# Patient Record
Sex: MALE | Race: WHITE | NOT HISPANIC OR LATINO | Employment: OTHER | URBAN - METROPOLITAN AREA
[De-identification: names, ages, dates, MRNs, and addresses within clinical notes are randomized per-mention and may not be internally consistent; named-entity substitution may affect disease eponyms.]

---

## 2018-10-10 ENCOUNTER — APPOINTMENT (EMERGENCY)
Dept: RADIOLOGY | Facility: HOSPITAL | Age: 73
End: 2018-10-10
Payer: COMMERCIAL

## 2018-10-10 ENCOUNTER — HOSPITAL ENCOUNTER (OUTPATIENT)
Facility: HOSPITAL | Age: 73
Setting detail: OBSERVATION
Discharge: HOME | End: 2018-10-10
Attending: SURGERY | Admitting: SURGERY
Payer: COMMERCIAL

## 2018-10-10 VITALS
OXYGEN SATURATION: 99 % | SYSTOLIC BLOOD PRESSURE: 111 MMHG | HEART RATE: 80 BPM | RESPIRATION RATE: 18 BRPM | HEIGHT: 66 IN | TEMPERATURE: 98.1 F | WEIGHT: 191.2 LBS | BODY MASS INDEX: 30.73 KG/M2 | DIASTOLIC BLOOD PRESSURE: 68 MMHG

## 2018-10-10 PROBLEM — T07.XXXA MULTIPLE ABRASIONS: Status: ACTIVE | Noted: 2018-10-10
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
## 2018-10-10 PROBLEM — F10.920 ALCOHOLIC INTOXICATION WITHOUT COMPLICATION (HCC): Status: ACTIVE | Noted: 2018-10-10

## 2018-10-10 LAB
ANION GAP SERPL CALCULATED.3IONS-SCNC: 6 MMOL/L (ref 4–13)
APTT PPP: 27 SECONDS (ref 24–36)
BASE EXCESS BLDA CALC-SCNC: 0 MMOL/L (ref -2–3)
BASOPHILS # BLD AUTO: 0.05 THOUSANDS/ΜL (ref 0–0.1)
BASOPHILS NFR BLD AUTO: 1 % (ref 0–1)
BUN SERPL-MCNC: 11 MG/DL (ref 5–25)
CA-I BLD-SCNC: 1.09 MMOL/L (ref 1.12–1.32)
CALCIUM SERPL-MCNC: 8.5 MG/DL (ref 8.3–10.1)
CHLORIDE SERPL-SCNC: 112 MMOL/L (ref 100–108)
CK MB SERPL-MCNC: 1.3 % (ref 0–2.5)
CK MB SERPL-MCNC: 5.4 NG/ML (ref 0–5)
CK SERPL-CCNC: 415 U/L (ref 39–308)
CO2 SERPL-SCNC: 27 MMOL/L (ref 21–32)
CREAT SERPL-MCNC: 0.75 MG/DL (ref 0.6–1.3)
EOSINOPHIL # BLD AUTO: 0.05 THOUSAND/ΜL (ref 0–0.61)
EOSINOPHIL NFR BLD AUTO: 1 % (ref 0–6)
ERYTHROCYTE [DISTWIDTH] IN BLOOD BY AUTOMATED COUNT: 13 % (ref 11.6–15.1)
ETHANOL SERPL-MCNC: 265 MG/DL (ref 0–3)
GFR SERPL CREATININE-BSD FRML MDRD: 91 ML/MIN/1.73SQ M
GLUCOSE SERPL-MCNC: 100 MG/DL (ref 65–140)
GLUCOSE SERPL-MCNC: 100 MG/DL (ref 65–140)
HCO3 BLDA-SCNC: 26 MMOL/L (ref 24–30)
HCT VFR BLD AUTO: 42.2 % (ref 36.5–49.3)
HCT VFR BLD CALC: 40 % (ref 36.5–49.3)
HGB BLD-MCNC: 14.1 G/DL (ref 12–17)
HGB BLDA-MCNC: 13.6 G/DL (ref 12–17)
IMM GRANULOCYTES # BLD AUTO: 0.03 THOUSAND/UL (ref 0–0.2)
IMM GRANULOCYTES NFR BLD AUTO: 0 % (ref 0–2)
INR PPP: 1.11 (ref 0.86–1.17)
LYMPHOCYTES # BLD AUTO: 3.64 THOUSANDS/ΜL (ref 0.6–4.47)
LYMPHOCYTES NFR BLD AUTO: 46 % (ref 14–44)
MCH RBC QN AUTO: 31.1 PG (ref 26.8–34.3)
MCHC RBC AUTO-ENTMCNC: 33.4 G/DL (ref 31.4–37.4)
MCV RBC AUTO: 93 FL (ref 82–98)
MONOCYTES # BLD AUTO: 0.52 THOUSAND/ΜL (ref 0.17–1.22)
MONOCYTES NFR BLD AUTO: 7 % (ref 4–12)
NEUTROPHILS # BLD AUTO: 3.59 THOUSANDS/ΜL (ref 1.85–7.62)
NEUTS SEG NFR BLD AUTO: 45 % (ref 43–75)
NRBC BLD AUTO-RTO: 0 /100 WBCS
PCO2 BLD: 27 MMOL/L (ref 21–32)
PCO2 BLD: 45.1 MM HG (ref 42–50)
PH BLD: 7.37 [PH] (ref 7.3–7.4)
PLATELET # BLD AUTO: 197 THOUSANDS/UL (ref 149–390)
PMV BLD AUTO: 10.5 FL (ref 8.9–12.7)
PO2 BLD: 35 MM HG (ref 35–45)
POTASSIUM BLD-SCNC: 3.9 MMOL/L (ref 3.5–5.3)
POTASSIUM SERPL-SCNC: 3.8 MMOL/L (ref 3.5–5.3)
PROTHROMBIN TIME: 14.4 SECONDS (ref 11.8–14.2)
RBC # BLD AUTO: 4.53 MILLION/UL (ref 3.88–5.62)
SAO2 % BLD FROM PO2: 65 % (ref 95–98)
SODIUM BLD-SCNC: 146 MMOL/L (ref 136–145)
SODIUM SERPL-SCNC: 145 MMOL/L (ref 136–145)
SPECIMEN SOURCE: ABNORMAL
WBC # BLD AUTO: 7.88 THOUSAND/UL (ref 4.31–10.16)

## 2018-10-10 PROCEDURE — 85610 PROTHROMBIN TIME: CPT | Performed by: EMERGENCY MEDICINE

## 2018-10-10 PROCEDURE — 82553 CREATINE MB FRACTION: CPT | Performed by: EMERGENCY MEDICINE

## 2018-10-10 PROCEDURE — 36415 COLL VENOUS BLD VENIPUNCTURE: CPT | Performed by: EMERGENCY MEDICINE

## 2018-10-10 PROCEDURE — 99285 EMERGENCY DEPT VISIT HI MDM: CPT

## 2018-10-10 PROCEDURE — 80320 DRUG SCREEN QUANTALCOHOLS: CPT | Performed by: EMERGENCY MEDICINE

## 2018-10-10 PROCEDURE — 82550 ASSAY OF CK (CPK): CPT | Performed by: EMERGENCY MEDICINE

## 2018-10-10 PROCEDURE — 85730 THROMBOPLASTIN TIME PARTIAL: CPT | Performed by: EMERGENCY MEDICINE

## 2018-10-10 PROCEDURE — 70450 CT HEAD/BRAIN W/O DYE: CPT

## 2018-10-10 PROCEDURE — 82803 BLOOD GASES ANY COMBINATION: CPT

## 2018-10-10 PROCEDURE — 84295 ASSAY OF SERUM SODIUM: CPT

## 2018-10-10 PROCEDURE — 85014 HEMATOCRIT: CPT

## 2018-10-10 PROCEDURE — 80048 BASIC METABOLIC PNL TOTAL CA: CPT | Performed by: EMERGENCY MEDICINE

## 2018-10-10 PROCEDURE — 72125 CT NECK SPINE W/O DYE: CPT

## 2018-10-10 PROCEDURE — 82947 ASSAY GLUCOSE BLOOD QUANT: CPT

## 2018-10-10 PROCEDURE — 82330 ASSAY OF CALCIUM: CPT

## 2018-10-10 PROCEDURE — 99218 PR INITIAL OBSERVATION CARE/DAY 30 MINUTES: CPT | Performed by: SURGERY

## 2018-10-10 PROCEDURE — 84132 ASSAY OF SERUM POTASSIUM: CPT

## 2018-10-10 PROCEDURE — 96365 THER/PROPH/DIAG IV INF INIT: CPT

## 2018-10-10 PROCEDURE — 85025 COMPLETE CBC W/AUTO DIFF WBC: CPT | Performed by: EMERGENCY MEDICINE

## 2018-10-10 RX ORDER — DUTASTERIDE 0.5 MG/1
0.5 CAPSULE, LIQUID FILLED ORAL DAILY
COMMUNITY

## 2018-10-10 RX ORDER — TAMSULOSIN HYDROCHLORIDE 0.4 MG/1
0.4 CAPSULE ORAL
COMMUNITY

## 2018-10-10 RX ORDER — SODIUM CHLORIDE, SODIUM GLUCONATE, SODIUM ACETATE, POTASSIUM CHLORIDE, MAGNESIUM CHLORIDE, SODIUM PHOSPHATE, DIBASIC, AND POTASSIUM PHOSPHATE .53; .5; .37; .037; .03; .012; .00082 G/100ML; G/100ML; G/100ML; G/100ML; G/100ML; G/100ML; G/100ML
INJECTION, SOLUTION INTRAVENOUS
Status: COMPLETED | OUTPATIENT
Start: 2018-10-10 | End: 2018-10-10

## 2018-10-10 RX ORDER — SODIUM CHLORIDE, SODIUM GLUCONATE, SODIUM ACETATE, POTASSIUM CHLORIDE, MAGNESIUM CHLORIDE, SODIUM PHOSPHATE, DIBASIC, AND POTASSIUM PHOSPHATE .53; .5; .37; .037; .03; .012; .00082 G/100ML; G/100ML; G/100ML; G/100ML; G/100ML; G/100ML; G/100ML
100 INJECTION, SOLUTION INTRAVENOUS CONTINUOUS
Status: DISCONTINUED | OUTPATIENT
Start: 2018-10-10 | End: 2018-10-10 | Stop reason: HOSPADM

## 2018-10-10 RX ORDER — FOLIC ACID 1 MG/1
1 TABLET ORAL DAILY
Status: DISCONTINUED | OUTPATIENT
Start: 2018-10-10 | End: 2018-10-10 | Stop reason: HOSPADM

## 2018-10-10 RX ORDER — ATORVASTATIN CALCIUM 10 MG/1
10 TABLET, FILM COATED ORAL DAILY
COMMUNITY

## 2018-10-10 RX ORDER — ACETAMINOPHEN 325 MG/1
975 TABLET ORAL EVERY 8 HOURS SCHEDULED
Status: DISCONTINUED | OUTPATIENT
Start: 2018-10-10 | End: 2018-10-10 | Stop reason: HOSPADM

## 2018-10-10 RX ORDER — ZOLPIDEM TARTRATE 12.5 MG/1
12.5 TABLET, FILM COATED, EXTENDED RELEASE ORAL
COMMUNITY

## 2018-10-10 RX ORDER — LOSARTAN POTASSIUM 100 MG/1
100 TABLET ORAL DAILY
COMMUNITY

## 2018-10-10 RX ORDER — LATANOPROST 50 UG/ML
1 SOLUTION/ DROPS OPHTHALMIC
COMMUNITY

## 2018-10-10 RX ORDER — CLONAZEPAM 0.5 MG/1
0.5 TABLET ORAL 2 TIMES DAILY
COMMUNITY

## 2018-10-10 RX ORDER — BUTALBITAL, ACETAMINOPHEN AND CAFFEINE 50; 325; 40 MG/1; MG/1; MG/1
1 TABLET ORAL EVERY 4 HOURS PRN
Status: DISCONTINUED | OUTPATIENT
Start: 2018-10-10 | End: 2018-10-10 | Stop reason: HOSPADM

## 2018-10-10 RX ORDER — THIAMINE MONONITRATE (VIT B1) 100 MG
100 TABLET ORAL DAILY
Status: DISCONTINUED | OUTPATIENT
Start: 2018-10-10 | End: 2018-10-10 | Stop reason: HOSPADM

## 2018-10-10 RX ADMIN — Medication 1 TABLET: at 08:33

## 2018-10-10 RX ADMIN — SODIUM CHLORIDE, SODIUM GLUCONATE, SODIUM ACETATE, POTASSIUM CHLORIDE, MAGNESIUM CHLORIDE, SODIUM PHOSPHATE, DIBASIC, AND POTASSIUM PHOSPHATE 1000 ML: .53; .5; .37; .037; .03; .012; .00082 INJECTION, SOLUTION INTRAVENOUS at 03:20

## 2018-10-10 RX ADMIN — THIAMINE HCL TAB 100 MG 100 MG: 100 TAB at 08:33

## 2018-10-10 RX ADMIN — SODIUM CHLORIDE, SODIUM GLUCONATE, SODIUM ACETATE, POTASSIUM CHLORIDE, MAGNESIUM CHLORIDE, SODIUM PHOSPHATE, DIBASIC, AND POTASSIUM PHOSPHATE 100 ML/HR: .53; .5; .37; .037; .03; .012; .00082 INJECTION, SOLUTION INTRAVENOUS at 05:43

## 2018-10-10 RX ADMIN — FOLIC ACID 1 MG: 1 TABLET ORAL at 08:33

## 2018-10-10 RX ADMIN — BUTALBITAL, ACETAMINOPHEN, AND CAFFEINE 1 TABLET: 50; 325; 40 TABLET ORAL at 12:23

## 2018-10-10 NOTE — H&P
H&P Exam - Trauma   Judit Luong 67 y o  male MRN: [id number]  Unit/Bed#: TR 02 Encounter: [id number]    Trauma Assessment and Plan: This is a 67year old male who presents as a level B trauma after being found down by bystanders with confusion and an occipital confusion, positive EtOH  Assessment/Plan   Trauma Alert: Level B  Model of Arrival: Ambulance  Trauma Team: Attending Christoph Guillory, Residents [address] and Fellow Jane  Consultants: None    Trauma Active Problems: Multiple abrasions, alcohol intoxication    Trauma Plan:   Alcohol intoxication  -  Admit trauma obs  -  CIWA protocol    Multiple abrasions  -  Local wound care    Negative CT C-spine and no neurologic deficits  C-collar can be cleared  The sensitivity and specificity for clinically significant injury is 98 5% and 91 0% with a negative predictive value of 99 97%  *    Based on EAST guidelines, C-spine can be cleared based on negative CT C-spine alone  **    *Nestor RIVERA, et  al   "Cervical spinal clearance:  A prospective Western Trauma Association Multi-intitutional Trial "  Journal of Trauma Acure Care Surgery  2016; 81(6):  1122  **Of five articles with a total follow-up of 1,017 included subjects, none reported new neurologic change (paraplegia or quadriplegia) after cervical collar removal  Of 11 studies with a total of 1,718 subjects, no study reported an unstable C-spine fracture; one of the studies did not clearly report this outcome  The negative predictive value for C-spine CT was 100% for an unstable C-spine injury and 91% for any stable injury of the C-spine     Chief Complaint: No complaints at this time    History of Present Illness   HPI:  Judit Luong is a 67 y o  male who presents as a level B trauma after being found down by bystanders with confusion and bleeding from posterior scalp  The patient states that he was drinking alcohol tonight and does not remember what led up to the events of his fall    Currently, denies any pain   He is up to date on his tetanus  Mechanism:Fall    Review of Systems   Constitutional: Negative for chills, fatigue and fever  HENT: Negative for rhinorrhea, sore throat and trouble swallowing  Eyes: Negative for photophobia and visual disturbance  Respiratory: Negative for cough, chest tightness and shortness of breath  Cardiovascular: Negative for chest pain, palpitations and leg swelling  Gastrointestinal: Negative for abdominal pain, blood in stool, diarrhea, nausea and vomiting  Endocrine: Negative for polyuria  Genitourinary: Negative for dysuria, flank pain and hematuria  Musculoskeletal: Negative for back pain and neck pain  Skin: Positive for wound  Negative for color change and rash  Allergic/Immunologic: Negative for immunocompromised state  Neurological: Negative for dizziness, weakness, light-headedness, numbness and headaches  All other systems reviewed and are negative  Historical Information     Past Medical History: No past medical history on file , Past Surgical History: No past surgical history on file , Alcohol Use:   History   Alcohol use Not on file   , Drug Use:   History   Drug use: Unknown   , Family History: non-contributory    Meds/Allergies     (Not in a hospital admission)      Allergies not on file      PHYSICAL EXAM    Objective   Vitals:   First set: Temperature: (!) 96 5 °F (35 8 °C) (10/10/18 0314)  Pulse: 78 (10/10/18 0314)  Respirations: 19 (10/10/18 0314)  Blood Pressure: 111/69 (10/10/18 0314)    Primary Survey:   (A) Airway: Intact  (B) Breathing: CTA b/l  (C) Circulation: Pulses:   carotid  2/4, pedal  2/4, radial  2/4 and femoral  2/4  (D) Disabliity:  GCS Total:  15  (E) Expose:  Completed    Secondary Survey: (Click on Physical Exam tab above)  Physical Exam   Constitutional: He is oriented to person, place, and time  Vital signs are normal  He appears well-developed and well-nourished  He is cooperative  He does not appear ill  HENT:   Right Ear: Hearing, tympanic membrane, external ear and ear canal normal  No hemotympanum  Left Ear: Hearing, tympanic membrane, external ear and ear canal normal  No hemotympanum  Nose: Nose normal  No septal deviation or nasal septal hematoma  Mouth/Throat: Uvula is midline, oropharynx is clear and moist and mucous membranes are normal    Abrasions to nose and occiput   Eyes: Pupils are equal, round, and reactive to light  Conjunctivae, EOM and lids are normal    Neck: Trachea normal  No spinous process tenderness and no muscular tenderness present  Cardiovascular: Normal rate, regular rhythm, normal heart sounds, intact distal pulses and normal pulses  No murmur heard  Pulses:       Carotid pulses are 2+ on the right side, and 2+ on the left side  Radial pulses are 2+ on the right side, and 2+ on the left side  Femoral pulses are 2+ on the right side, and 2+ on the left side  Dorsalis pedis pulses are 2+ on the right side, and 2+ on the left side  Pulmonary/Chest: Effort normal and breath sounds normal  He exhibits no tenderness and no bony tenderness  Abdominal: Soft  Normal appearance and bowel sounds are normal  There is no tenderness  There is no rigidity, no rebound and no guarding  Genitourinary:   Genitourinary Comments: No perineal hematoma   Musculoskeletal:   No C-spine, T-spine, L-spine tenderness  No bony tenderness throughout  Pelvis stable nontender  Negative logroll bilateral lower extremities  Neurological: He is alert and oriented to person, place, and time  He has normal strength  No cranial nerve deficit or sensory deficit  Coordination normal  GCS eye subscore is 4  GCS verbal subscore is 5  GCS motor subscore is 6  Invasive Devices     Peripheral Intravenous Line            Peripheral IV 10/10/18 Right Antecubital less than 1 day                Lab Results: Results: I have personally reviewed pertinent reports      Imaging/EKG Studies: Results: I have personally reviewed pertinent reports  , FAST: Negative  Other Studies: I have reviewed all pertinent reports  Code Status: No Order  Advance Directive and Living Will:      Power of :    POLST:      Counseling / Coordination of Care  Total floor / unit time spent today 60 minutes  This involved direct patient contact where I performed a full history and physical, reviewed previous records, and reviewed laboratory and other diagnostic studies  Total Critical Care time spent 60 minutes excluding procedures, teaching and family updates

## 2018-10-10 NOTE — DISCHARGE INSTRUCTIONS
You are at risk for developing a concussion  You may experience headaches, photophobia, nausea, dizziness or generalized fatigue  If you develop these symptoms, call the trauma clinic to schedule follow-up  This injury will take time to heal  COGNITIVE REST and TIME are key to healing  Please limit reading, texting, and/or computer use to 15 minute intervals and only as tolerates  Avoid excessive external stimulation, such as caring for children, pets or going to class or work  Please call the Trauma Clinic sooner with any questions or concerns or if you experience any worsening symptoms or new nausea, vomiting, vision or hearing changes, or worsening headache symptoms

## 2018-10-10 NOTE — UTILIZATION REVIEW
Initial Clinical Review    Admission: Date/Time/Statement: 10/10 @ 0405    Orders Placed This Encounter   Procedures    Place in Observation     Standing Status:   Standing     Number of Occurrences:   1     Order Specific Question:   Admitting Physician     Answer:   Francie Narayan [[id number]]     Order Specific Question:   Level of Care     Answer:   Med Surg [16]       ED: Date/Time/Mode of Arrival:   ED Arrival Information     Expected Arrival Acuity Means of Arrival Escorted By Service Admission Type    - 10/10/2018 03:12 Immediate [facility] Emergency    Arrival Complaint    -          Chief Complaint:   Chief Complaint   Patient presents with    Fall       History of Illness: 67 y o  male who presents as a level B trauma after being found down by bystanders with confusion and bleeding from posterior scalp  The patient states that he was drinking alcohol tonight and does not remember what led up to the events of his fall  ED Vital Signs:   ED Triage Vitals   Temperature Pulse Respirations Blood Pressure SpO2   10/10/18 0314 10/10/18 0314 10/10/18 0314 10/10/18 0314 10/10/18 0314   (!) 96 5 °F (35 8 °C) 78 19 111/69 100 %      Temp Source Heart Rate Source Patient Position - Orthostatic VS BP Location FiO2 (%)   10/10/18 0314 10/10/18 0314 10/10/18 0319 10/10/18 0507 --   Tympanic Monitor Lying Left arm       Pain Score       --               Wt Readings from Last 1 Encounters:   10/10/18 86 7 kg (191 lb 3 2 oz)       Vital Signs (abnormal): WNL    Abnormal Labs: CK MB = 5 4  Total CK = 415  Etoh Level = 265    Diagnostic Test Results: CT Head - No acute intracranial abnormality  ED Treatment:   Medication Administration from 10/10/2018 0302 to 10/10/2018 0503       Date/Time Order Dose Route Action     10/10/2018 0320 multi-electrolyte (ISOLYTE-S PH 7 4) bolus 1,000 mL Intravenous New Bag       Past Medical/Surgical History:    Active Ambulatory Problems     Diagnosis Date Noted    No Active Ambulatory Problems     Resolved Ambulatory Problems     Diagnosis Date Noted    No Resolved Ambulatory Problems     Past Medical History:   Diagnosis Date    Depression     Hypertension     Insomnia        Admitting Diagnosis: Head injury [S09 90XA]    Age/Sex: 67 y o  male    Assessment/Plan:   75y Male to ED, with Multiple abrasions/ Alcohol Intoxication  Pt found down and does not remember what happened  CIWA protocol   Local wound care    CIWA Score = 0    Admission Orders:  Continuous Pulse Ox    Scheduled Meds:   Current Facility-Administered Medications:  folic acid 1 mg Oral Daily   multivitamin-minerals 1 tablet Oral Daily   thiamine 100 mg Oral Daily     Continuous Infusions:   multi-electrolyte 100 mL/hr Last Rate: 100 mL/hr (10/10/18 0543)     PRN Meds:

## 2018-10-10 NOTE — SOCIAL WORK
CM met with pt and his sister to discuss the role of CM  Pt lives alone (wife in [place]) in a 2nd floor apartment which has [address]STE  Pt drives and was fully independent  Pt owns no DME or living will  Pt's pharmacy is [facility] [place]  Pt reports no hx of mental health though he is stressed with his wife being in a nursing home  Pt admits to occasional drinking but when drinking, he admits to binge drinking  "I will drink until I fall down " Pt reports having blackouts and a DUI in 1994  Pt reports never having withdrawal symptoms  Pt is familiar with D&A resources in the area  Pt offered HOST but pt refused  SBIRT was completed  Pt will attempt a diet and then should d/c home  Pt's sister will transport  CM reviewed d/c planning process including the following: identifying help at home, patient preference for d/c planning needs, Discharge Lounge, [facility] Meds to Bed program, availability of treatment team to discuss questions or concerns patient and/or family may have regarding understanding medications and recognizing signs and symptoms once discharged  CM also encouraged patient to follow up with all recommended appointments after discharge  Patient advised of importance for patient and family to participate in managing patients medical well being

## 2018-10-10 NOTE — DISCHARGE SUMMARY
Discharge Summary - [address] 67 y o  male MRN: [id number]    Unit/Bed#: [facility] 922-01 Encounter: [id number]    Admission Date:   Admission Orders     Ordered        10/10/18 0404  Place in Observation  Once               Admitting Diagnosis: Head injury [S09 90XA]    HPI: Per Dr Eber Stewart on admission "Dawson [address] is a 67 y o  male who presents as a level B trauma after being found down by bystanders with confusion and bleeding from posterior scalp  The patient states that he was drinking alcohol tonight and does not remember what led up to the events of his fall  Currently, denies any pain  He is up to date on his tetanus "    Procedures Performed: No orders of the defined types were placed in this encounter  Summary of Hospital Course: Mr Prabha Moseley was monitored after fall  He was not found to have any traumatic injuries  He had no symptoms of concussion on day of discharge  He has been ambulating without difficulty  He is stable for discharge to home with his wife  He was provided with trauma's contact information should he develop problems with concussion or concussive symptoms  He was offered resources for alcohol cessation but declined, stating he does not usually drink  Tertiary:   ROS: "I am ok  Embarrassed " denies headache, nausea, vomiting, dizziness, difficulty with ROM, gait disturbance, pain or contusions  Denies SOB or CP  PE:  Lying in bed, appears comfortable  Heart rate regular, +2 DP pulses, no edema  Lung fields are clear, no wheezing or crackles, unlabored respirations  Abdomen soft nontender  No hematoma, contusion, bruising  No apparent deformity or difficulty with ROM  Strength equal bilaterally  Neuro: GCS 15, no deficits      Significant Findings, Care, Treatment and Services Provided:   Trauma - Ct Head Wo Contrast    Result Date: 10/10/2018  Impression: No acute intracranial abnormality   Workstation performed: [id number]     Trauma - Ct Spine Cervical Wo Contrast    Result Date: 10/10/2018  Impression: No cervical spine fracture or traumatic malalignment  Workstation performed: [id number]     Xr Trauma Multiple    Result Date: 10/10/2018  Impression: No active pulmonary disease  Workstation performed: [id number]       Complications: none    Discharge Diagnosis:   Patient Active Problem List   Diagnosis    Alcoholic intoxication without complication ([facility] [address] )    Multiple abrasions    Fall             Condition at Discharge: good         Discharge instructions/Information to patient and family:   See after visit summary for information provided to patient and family  Provisions for Follow-Up Care:  See after visit summary for information related to follow-up care and any pertinent home health orders  PCP: Frederick Haley MD    Disposition: Home    Planned Readmission: No    Discharge Statement   I spent 12 minutes discharging the patient  This time was spent on the day of discharge  I had direct contact with the patient on the day of discharge  Additional documentation is required if more than 30 minutes were spent on discharge  Discharge Medications:  See after visit summary for reconciled discharge medications provided to patient and family

## 2018-10-10 NOTE — ED NOTES
I STAT cg8 that resulted at [facility] on 10/10/18 ran under incorrect patient  Lab made aware and entry will be deleted        Lily Arch  10/10/18 3850

## 2023-09-27 ENCOUNTER — TELEPHONE (OUTPATIENT)
Dept: GASTROENTEROLOGY | Facility: CLINIC | Age: 78
End: 2023-09-27

## 2023-09-27 ENCOUNTER — CONSULT (OUTPATIENT)
Dept: GASTROENTEROLOGY | Facility: CLINIC | Age: 78
End: 2023-09-27
Payer: COMMERCIAL

## 2023-09-27 VITALS — HEIGHT: 68 IN | WEIGHT: 216.8 LBS | BODY MASS INDEX: 32.86 KG/M2

## 2023-09-27 DIAGNOSIS — Z86.010 HISTORY OF ADENOMATOUS POLYP OF COLON: Primary | ICD-10-CM

## 2023-09-27 DIAGNOSIS — Z86.010 PERSONAL HISTORY OF COLONIC POLYPS: ICD-10-CM

## 2023-09-27 DIAGNOSIS — I48.91 ATRIAL FIBRILLATION, UNSPECIFIED TYPE (HCC): ICD-10-CM

## 2023-09-27 DIAGNOSIS — K59.00 CONSTIPATION, UNSPECIFIED CONSTIPATION TYPE: ICD-10-CM

## 2023-09-27 DIAGNOSIS — K57.90 DIVERTICULAR DISEASE: ICD-10-CM

## 2023-09-27 PROCEDURE — 99204 OFFICE O/P NEW MOD 45 MIN: CPT | Performed by: INTERNAL MEDICINE

## 2023-09-27 RX ORDER — AMLODIPINE BESYLATE 10 MG/1
10 TABLET ORAL DAILY
COMMUNITY

## 2023-09-27 RX ORDER — POLYETHYLENE GLYCOL 3350, SODIUM SULFATE ANHYDROUS, SODIUM BICARBONATE, SODIUM CHLORIDE, POTASSIUM CHLORIDE 236; 22.74; 6.74; 5.86; 2.97 G/4L; G/4L; G/4L; G/4L; G/4L
4 POWDER, FOR SOLUTION ORAL ONCE
Qty: 4000 ML | Refills: 0 | Status: SHIPPED | OUTPATIENT
Start: 2023-09-27 | End: 2023-09-27

## 2023-09-27 RX ORDER — CALCIUM CARBONATE/VITAMIN D3 500MG-5MCG
TABLET ORAL
COMMUNITY

## 2023-09-27 RX ORDER — TROSPIUM CHLORIDE 20 MG/1
TABLET, FILM COATED ORAL
COMMUNITY
Start: 2023-08-31

## 2023-09-27 RX ORDER — TROSPIUM CHLORIDE 20 MG/1
20 TABLET, FILM COATED ORAL 2 TIMES DAILY
COMMUNITY
Start: 2023-05-10 | End: 2024-05-09

## 2023-09-27 NOTE — PROGRESS NOTES
[facility] - Outpatient Consultation  Abdias Byers 65 y.o. male MRN: [id number]  Encounter: [id number]          ASSESSMENT AND PLAN:      1. History of adenomatous polyp of colon  -     Colonoscopy; Future; Expected date: 09/27/2023  -     polyethylene glycol (Golytely) 4000 mL solution; Take 4,000 mL by mouth once for 1 dose Take according to instructions given by the office for colonoscopy bowel prep. 2. Personal history of colonic polyps    3. Diverticular disease    4. Constipation, unspecified constipation type    5. Atrial fibrillation, unspecified type ([address])  -     Colonoscopy; Future; Expected date: 09/27/2023      History of adenomatous colon polyps, diverticulosis, recent constipation, advised patient to increase fluid and fiber in the diet to manage the bowels better. Less mobility may be playing a role as well. Some of the medication could be contributing to. He can take some MiraLAX or stool softeners to manage the bowels better. Schedule colonoscopy, will get clearance for cardiology stop Eliquis as well. History of A-fib CVA on Eliquis.  ______________________________________________________________________    HPI:      Patient came in for evaluation and discussion of his history of adenomatous colon polyps, diverticulosis, recent constipation, wants consider evaluation with colonoscopy. Appetite is fair weight stable denies dysphagia coughing choking spells nocturnal reflux regurgitation bronchitis pneumonias. Trying to consume more fluid and fiber in the diet to improve his bowels. Does have history of A-fib, CVA couple years ago, on Eliquis. Diet medications more than 10 pertinent systems some of the prior records noted. REVIEW OF SYSTEMS:    CONSTITUTIONAL: Denies any fever, chills, rigors, and weight loss. HEENT: No earache or tinnitus. CARDIOVASCULAR: No chest pain or palpitations.    RESPIRATORY: Denies any cough, hemoptysis, shortness of breath or dyspnea on exertion. GASTROINTESTINAL: As noted in the History of Present Illness. GENITOURINARY: Denies any hematuria or dysuria. NEUROLOGIC: No dizziness or vertigo. MUSCULOSKELETAL: Denies any joint swellings. SKIN: Denies skin rashes or itching. ENDOCRINE: Denies excessive thirst. Denies intolerance to heat or cold. PSYCHOSOCIAL: Denies depression or anxiety. Denies any recent memory loss. Historical Information   Past Medical History:   Diagnosis Date   • A-fib ([address])    • Colon polyp    • Depression    • Hypertension    • Insomnia      Past Surgical History:   Procedure Laterality Date   • COLONOSCOPY       Social History   Social History     Substance and Sexual Activity   Alcohol Use Yes    Comment: drinks liquor and beer; cant say how much     Social History     Substance and Sexual Activity   Drug Use No     Social History     Tobacco Use   Smoking Status Never   Smokeless Tobacco Never   Tobacco Comments    Cigar once in awhile     Family History   Problem Relation Age of Onset   • Pancreatic cancer Son        Meds/Allergies       Current Outpatient Medications:   •  amLODIPine (NORVASC) 10 mg tablet  •  apixaban (Eliquis) 5 mg  •  atorvastatin (LIPITOR) 10 mg tablet  •  Calcium Carb-Cholecalciferol (Os-Jose C Calcium + D3) 500-5 MG-MCG TABS  •  latanoprost (XALATAN) 0.005 % ophthalmic solution  •  losartan (COZAAR) 100 MG tablet  •  metoprolol tartrate (LOPRESSOR) 25 mg tablet  •  polyethylene glycol (Golytely) 4000 mL solution  •  trospium chloride (SANCTURA) 20 mg tablet  •  clonazePAM (KlonoPIN) 0.5 mg tablet  •  dutasteride (AVODART) 0.5 mg capsule  •  tamsulosin (FLOMAX) 0.4 mg  •  trospium chloride (SANCTURA) 20 mg tablet  •  zolpidem (AMBIEN CR) 12.5 MG CR tablet    No Known Allergies        Objective     Height 5' 8" (1.727 m), weight 98.3 kg (216 lb 12.8 oz). Body mass index is 32.96 kg/m².         PHYSICAL EXAM:      General Appearance:   Alert, cooperative, no distress HEENT:   Normocephalic, atraumatic, anicteric. Neck:  Supple, symmetrical, trachea midline   Lungs:   Clear to auscultation bilaterally; no rales, rhonchi or wheezing; respirations unlabored    Heart[de-identified]   Regular rate and rhythm; no murmur. Abdomen:   Soft, non-tender, non-distended; normal bowel sounds; no masses, no organomegaly    Genitalia:   Deferred    Rectal:   Deferred    Extremities:  No cyanosis, clubbing or edema    Skin:  No jaundice, rashes, or lesions    Lymph nodes:  No palpable cervical lymphadenopathy        Lab Results:   No visits with results within 1 Day(s) from this visit.    Latest known visit with results is:   Admission on 10/10/2018, Discharged on 10/10/2018   Component Date Value   • WBC 10/10/2018 7.88    • RBC 10/10/2018 4.53    • Hemoglobin 10/10/2018 14.1    • Hematocrit 10/10/2018 42.2    • MCV 10/10/2018 93    • MCH 10/10/2018 31.1    • MCHC 10/10/2018 33.4    • RDW 10/10/2018 13.0    • MPV 10/10/2018 10.5    • Platelets 77/30/5066 197    • nRBC 10/10/2018 0    • Neutrophils Relative 10/10/2018 45    • Immat GRANS % 10/10/2018 0    • Lymphocytes Relative 10/10/2018 46 (H)    • Monocytes Relative 10/10/2018 7    • Eosinophils Relative 10/10/2018 1    • Basophils Relative 10/10/2018 1    • Neutrophils Absolute 10/10/2018 3.59    • Immature Grans Absolute 10/10/2018 0.03    • Lymphocytes Absolute 10/10/2018 3.64    • Monocytes Absolute 10/10/2018 0.52    • Eosinophils Absolute 10/10/2018 0.05    • Basophils Absolute 10/10/2018 0.05    • Sodium 10/10/2018 145    • Potassium 10/10/2018 3.8    • Chloride 10/10/2018 112 (H)    • CO2 10/10/2018 27    • ANION GAP 10/10/2018 6    • BUN 10/10/2018 11    • Creatinine 10/10/2018 0.75    • Glucose 10/10/2018 100    • Calcium 10/10/2018 8.5    • eGFR 10/10/2018 91    • Protime 10/10/2018 14.4 (H)    • INR 10/10/2018 1.11    • PTT 10/10/2018 27    • Ethanol Lvl 10/10/2018 265 (H)    • Total CK 10/10/2018 415 (H)    • ph, [place] ISTAT 10/10/2018 • pCO2, [place] i-STAT 10/10/2018     • pO2, [place] i-STAT 10/10/2018     • BE, i-STAT 10/10/2018     • HCO3, [place]zar i-STAT 10/10/2018     • CO2, i-STAT 10/10/2018     • O2 Sat, i-STAT 10/10/2018     • SODIUM, I-STAT 10/10/2018     • Potassium, i-STAT 10/10/2018     • Calcium, Ionized i-STAT 10/10/2018     • Hct, i-STAT 10/10/2018     • Hgb, i-STAT 10/10/2018     • Glucose, i-STAT 10/10/2018     • Specimen Type 10/10/2018     • ph, [place] ISTAT 10/10/2018 7.369    • pCO2, [place] i-STAT 10/10/2018 45.1    • pO2, [place] i-STAT 10/10/2018 35.0    • BE, i-STAT 10/10/2018 0    • HCO3, [place]zar i-STAT 10/10/2018 26.0    • CO2, i-STAT 10/10/2018 27    • O2 Sat, i-STAT 10/10/2018 65 (L)    • SODIUM, I-STAT 10/10/2018 146 (H)    • Potassium, i-STAT 10/10/2018 3.9    • Calcium, Ionized i-STAT 10/10/2018 1.09 (L)    • Hct, i-STAT 10/10/2018 40    • Hgb, i-STAT 10/10/2018 13.6    • Glucose, i-STAT 10/10/2018 100    • Specimen Type 10/10/2018 VENOUS    • CK-MB Index 10/10/2018 1.3    • CK-MB 10/10/2018 5.4 (H)          Radiology Results:   No results found.

## 2023-09-27 NOTE — TELEPHONE ENCOUNTER
Scheduled date of colonoscopy (as of today): 10/26/23  Physician performing colonoscopy: Dr. Ruben Land  Location of colonoscopy: [facility]  Bowel prep reviewed with patient: [facility]   Instructions reviewed with patient by: ls  Clearances: Will fax eliquis clearance to Dr. Rosmery Huber.

## 2023-09-27 NOTE — H&P (VIEW-ONLY)
[facility] - Outpatient Consultation  Debbie Luo 65 y.o. male MRN: [id number]  Encounter: [id number]          ASSESSMENT AND PLAN:      1. History of adenomatous polyp of colon  -     Colonoscopy; Future; Expected date: 09/27/2023  -     polyethylene glycol (Golytely) 4000 mL solution; Take 4,000 mL by mouth once for 1 dose Take according to instructions given by the office for colonoscopy bowel prep. 2. Personal history of colonic polyps    3. Diverticular disease    4. Constipation, unspecified constipation type    5. Atrial fibrillation, unspecified type ([address])  -     Colonoscopy; Future; Expected date: 09/27/2023      History of adenomatous colon polyps, diverticulosis, recent constipation, advised patient to increase fluid and fiber in the diet to manage the bowels better. Less mobility may be playing a role as well. Some of the medication could be contributing to. He can take some MiraLAX or stool softeners to manage the bowels better. Schedule colonoscopy, will get clearance for cardiology stop Eliquis as well. History of A-fib CVA on Eliquis.  ______________________________________________________________________    HPI:      Patient came in for evaluation and discussion of his history of adenomatous colon polyps, diverticulosis, recent constipation, wants consider evaluation with colonoscopy. Appetite is fair weight stable denies dysphagia coughing choking spells nocturnal reflux regurgitation bronchitis pneumonias. Trying to consume more fluid and fiber in the diet to improve his bowels. Does have history of A-fib, CVA couple years ago, on Eliquis. Diet medications more than 10 pertinent systems some of the prior records noted. REVIEW OF SYSTEMS:    CONSTITUTIONAL: Denies any fever, chills, rigors, and weight loss. HEENT: No earache or tinnitus. CARDIOVASCULAR: No chest pain or palpitations.    RESPIRATORY: Denies any cough, hemoptysis, shortness of breath or dyspnea on exertion. GASTROINTESTINAL: As noted in the History of Present Illness. GENITOURINARY: Denies any hematuria or dysuria. NEUROLOGIC: No dizziness or vertigo. MUSCULOSKELETAL: Denies any joint swellings. SKIN: Denies skin rashes or itching. ENDOCRINE: Denies excessive thirst. Denies intolerance to heat or cold. PSYCHOSOCIAL: Denies depression or anxiety. Denies any recent memory loss. Historical Information   Past Medical History:   Diagnosis Date   • A-fib ([address])    • Colon polyp    • Depression    • Hypertension    • Insomnia      Past Surgical History:   Procedure Laterality Date   • COLONOSCOPY       Social History   Social History     Substance and Sexual Activity   Alcohol Use Yes    Comment: drinks liquor and beer; cant say how much     Social History     Substance and Sexual Activity   Drug Use No     Social History     Tobacco Use   Smoking Status Never   Smokeless Tobacco Never   Tobacco Comments    Cigar once in awhile     Family History   Problem Relation Age of Onset   • Pancreatic cancer Son        Meds/Allergies       Current Outpatient Medications:   •  amLODIPine (NORVASC) 10 mg tablet  •  apixaban (Eliquis) 5 mg  •  atorvastatin (LIPITOR) 10 mg tablet  •  Calcium Carb-Cholecalciferol (Os-Jose C Calcium + D3) 500-5 MG-MCG TABS  •  latanoprost (XALATAN) 0.005 % ophthalmic solution  •  losartan (COZAAR) 100 MG tablet  •  metoprolol tartrate (LOPRESSOR) 25 mg tablet  •  polyethylene glycol (Golytely) 4000 mL solution  •  trospium chloride (SANCTURA) 20 mg tablet  •  clonazePAM (KlonoPIN) 0.5 mg tablet  •  dutasteride (AVODART) 0.5 mg capsule  •  tamsulosin (FLOMAX) 0.4 mg  •  trospium chloride (SANCTURA) 20 mg tablet  •  zolpidem (AMBIEN CR) 12.5 MG CR tablet    No Known Allergies        Objective     Height 5' 8" (1.727 m), weight 98.3 kg (216 lb 12.8 oz). Body mass index is 32.96 kg/m².         PHYSICAL EXAM:      General Appearance:   Alert, cooperative, no distress HEENT:   Normocephalic, atraumatic, anicteric. Neck:  Supple, symmetrical, trachea midline   Lungs:   Clear to auscultation bilaterally; no rales, rhonchi or wheezing; respirations unlabored    Heart[de-identified]   Regular rate and rhythm; no murmur. Abdomen:   Soft, non-tender, non-distended; normal bowel sounds; no masses, no organomegaly    Genitalia:   Deferred    Rectal:   Deferred    Extremities:  No cyanosis, clubbing or edema    Skin:  No jaundice, rashes, or lesions    Lymph nodes:  No palpable cervical lymphadenopathy        Lab Results:   No visits with results within 1 Day(s) from this visit.    Latest known visit with results is:   Admission on 10/10/2018, Discharged on 10/10/2018   Component Date Value   • WBC 10/10/2018 7.88    • RBC 10/10/2018 4.53    • Hemoglobin 10/10/2018 14.1    • Hematocrit 10/10/2018 42.2    • MCV 10/10/2018 93    • MCH 10/10/2018 31.1    • MCHC 10/10/2018 33.4    • RDW 10/10/2018 13.0    • MPV 10/10/2018 10.5    • Platelets 62/23/1981 197    • nRBC 10/10/2018 0    • Neutrophils Relative 10/10/2018 45    • Immat GRANS % 10/10/2018 0    • Lymphocytes Relative 10/10/2018 46 (H)    • Monocytes Relative 10/10/2018 7    • Eosinophils Relative 10/10/2018 1    • Basophils Relative 10/10/2018 1    • Neutrophils Absolute 10/10/2018 3.59    • Immature Grans Absolute 10/10/2018 0.03    • Lymphocytes Absolute 10/10/2018 3.64    • Monocytes Absolute 10/10/2018 0.52    • Eosinophils Absolute 10/10/2018 0.05    • Basophils Absolute 10/10/2018 0.05    • Sodium 10/10/2018 145    • Potassium 10/10/2018 3.8    • Chloride 10/10/2018 112 (H)    • CO2 10/10/2018 27    • ANION GAP 10/10/2018 6    • BUN 10/10/2018 11    • Creatinine 10/10/2018 0.75    • Glucose 10/10/2018 100    • Calcium 10/10/2018 8.5    • eGFR 10/10/2018 91    • Protime 10/10/2018 14.4 (H)    • INR 10/10/2018 1.11    • PTT 10/10/2018 27    • Ethanol Lvl 10/10/2018 265 (H)    • Total CK 10/10/2018 415 (H)    • ph, [facility] ISTAT 10/10/2018 • pCO2, [facility] i-STAT 10/10/2018     • pO2, [facility] i-STAT 10/10/2018     • BE, i-STAT 10/10/2018     • HCO3, [place]zar i-STAT 10/10/2018     • CO2, i-STAT 10/10/2018     • O2 Sat, i-STAT 10/10/2018     • SODIUM, I-STAT 10/10/2018     • Potassium, i-STAT 10/10/2018     • Calcium, Ionized i-STAT 10/10/2018     • Hct, i-STAT 10/10/2018     • Hgb, i-STAT 10/10/2018     • Glucose, i-STAT 10/10/2018     • Specimen Type 10/10/2018     • ph, [facility] ISTAT 10/10/2018 7.369    • pCO2, [place] i-STAT 10/10/2018 45.1    • pO2, [place] i-STAT 10/10/2018 35.0    • BE, i-STAT 10/10/2018 0    • HCO3, [place]zar i-STAT 10/10/2018 26.0    • CO2, i-STAT 10/10/2018 27    • O2 Sat, i-STAT 10/10/2018 65 (L)    • SODIUM, I-STAT 10/10/2018 146 (H)    • Potassium, i-STAT 10/10/2018 3.9    • Calcium, Ionized i-STAT 10/10/2018 1.09 (L)    • Hct, i-STAT 10/10/2018 40    • Hgb, i-STAT 10/10/2018 13.6    • Glucose, i-STAT 10/10/2018 100    • Specimen Type 10/10/2018 VENOUS    • CK-MB Index 10/10/2018 1.3    • CK-MB 10/10/2018 5.4 (H)          Radiology Results:   No results found.

## 2023-09-28 NOTE — TELEPHONE ENCOUNTER
Faxed Eliquis clearance to Dr. Tamra Tidwell [phone]. Will call their office in one week to make sure received [phone].

## 2023-09-29 NOTE — TELEPHONE ENCOUNTER
Eliquis clearance received for pt from Dr. Adria Franco for pt to hold 2 days prior to the procedure. Will call pt to inform.

## 2023-10-12 ENCOUNTER — ANESTHESIA (OUTPATIENT)
Dept: ANESTHESIOLOGY | Facility: AMBULATORY SURGERY CENTER | Age: 78
End: 2023-10-12

## 2023-10-12 ENCOUNTER — ANESTHESIA EVENT (OUTPATIENT)
Dept: ANESTHESIOLOGY | Facility: AMBULATORY SURGERY CENTER | Age: 78
End: 2023-10-12

## 2023-10-18 ENCOUNTER — TELEPHONE (OUTPATIENT)
Dept: GASTROENTEROLOGY | Facility: CLINIC | Age: 78
End: 2023-10-18

## 2023-10-18 NOTE — TELEPHONE ENCOUNTER
lmom confirming pt's colonoscopy scheduled on 10/26/23 at [facility] with Dr. Oleg Cabello.  Informed [facility] would be calling 1-2 days prior with the arrival time. I asked pt to please hold his Eliquis 2 days priro to the procedure. Informed of clear liquid diet day prior as well as the bowel cleansing preparation. Informed would need a  the day of the procedure due to being under sedation. I asked pt to please call back to confirm.

## 2023-10-26 ENCOUNTER — ANESTHESIA EVENT (OUTPATIENT)
Dept: ANESTHESIOLOGY | Facility: AMBULATORY SURGERY CENTER | Age: 78
End: 2023-10-26

## 2023-10-26 ENCOUNTER — HOSPITAL ENCOUNTER (OUTPATIENT)
Dept: GASTROENTEROLOGY | Facility: AMBULATORY SURGERY CENTER | Age: 78
Discharge: HOME | End: 2023-10-26
Payer: COMMERCIAL

## 2023-10-26 ENCOUNTER — ANESTHESIA (OUTPATIENT)
Dept: GASTROENTEROLOGY | Facility: AMBULATORY SURGERY CENTER | Age: 78
End: 2023-10-26

## 2023-10-26 ENCOUNTER — ANESTHESIA EVENT (OUTPATIENT)
Dept: GASTROENTEROLOGY | Facility: AMBULATORY SURGERY CENTER | Age: 78
End: 2023-10-26

## 2023-10-26 ENCOUNTER — ANESTHESIA (OUTPATIENT)
Dept: ANESTHESIOLOGY | Facility: AMBULATORY SURGERY CENTER | Age: 78
End: 2023-10-26

## 2023-10-26 VITALS
BODY MASS INDEX: 34.99 KG/M2 | HEIGHT: 65 IN | OXYGEN SATURATION: 97 % | HEART RATE: 82 BPM | DIASTOLIC BLOOD PRESSURE: 72 MMHG | TEMPERATURE: 97.9 F | WEIGHT: 210 LBS | SYSTOLIC BLOOD PRESSURE: 141 MMHG | RESPIRATION RATE: 18 BRPM

## 2023-10-26 DIAGNOSIS — I48.91 ATRIAL FIBRILLATION, UNSPECIFIED TYPE (HCC): ICD-10-CM

## 2023-10-26 DIAGNOSIS — Z86.010 HISTORY OF ADENOMATOUS POLYP OF COLON: ICD-10-CM

## 2023-10-26 PROCEDURE — 45385 COLONOSCOPY W/LESION REMOVAL: CPT | Performed by: INTERNAL MEDICINE

## 2023-10-26 PROCEDURE — 45380 COLONOSCOPY AND BIOPSY: CPT | Performed by: INTERNAL MEDICINE

## 2023-10-26 PROCEDURE — 88305 TISSUE EXAM BY PATHOLOGIST: CPT | Performed by: PATHOLOGY

## 2023-10-26 RX ORDER — PROPOFOL 10 MG/ML
INJECTION, EMULSION INTRAVENOUS AS NEEDED
Status: DISCONTINUED | OUTPATIENT
Start: 2023-10-26 | End: 2023-10-26

## 2023-10-26 RX ORDER — PHENYLEPHRINE HCL IN 0.9% NACL 1 MG/10 ML
SYRINGE (ML) INTRAVENOUS AS NEEDED
Status: DISCONTINUED | OUTPATIENT
Start: 2023-10-26 | End: 2023-10-26

## 2023-10-26 RX ORDER — SODIUM CHLORIDE, SODIUM LACTATE, POTASSIUM CHLORIDE, CALCIUM CHLORIDE 600; 310; 30; 20 MG/100ML; MG/100ML; MG/100ML; MG/100ML
125 INJECTION, SOLUTION INTRAVENOUS CONTINUOUS
Status: CANCELLED | OUTPATIENT
Start: 2023-10-26

## 2023-10-26 RX ORDER — SODIUM CHLORIDE, SODIUM LACTATE, POTASSIUM CHLORIDE, CALCIUM CHLORIDE 600; 310; 30; 20 MG/100ML; MG/100ML; MG/100ML; MG/100ML
125 INJECTION, SOLUTION INTRAVENOUS CONTINUOUS
Status: DISCONTINUED | OUTPATIENT
Start: 2023-10-26 | End: 2023-10-30 | Stop reason: HOSPADM

## 2023-10-26 RX ADMIN — PROPOFOL 20 MG: 10 INJECTION, EMULSION INTRAVENOUS at 12:25

## 2023-10-26 RX ADMIN — PROPOFOL 20 MG: 10 INJECTION, EMULSION INTRAVENOUS at 12:26

## 2023-10-26 RX ADMIN — PROPOFOL 20 MG: 10 INJECTION, EMULSION INTRAVENOUS at 12:20

## 2023-10-26 RX ADMIN — SODIUM CHLORIDE, SODIUM LACTATE, POTASSIUM CHLORIDE, CALCIUM CHLORIDE: 600; 310; 30; 20 INJECTION, SOLUTION INTRAVENOUS at 12:05

## 2023-10-26 RX ADMIN — PROPOFOL 40 MG: 10 INJECTION, EMULSION INTRAVENOUS at 12:18

## 2023-10-26 RX ADMIN — PROPOFOL 100 MG: 10 INJECTION, EMULSION INTRAVENOUS at 12:16

## 2023-10-26 RX ADMIN — PROPOFOL 30 MG: 10 INJECTION, EMULSION INTRAVENOUS at 12:29

## 2023-10-26 RX ADMIN — PROPOFOL 20 MG: 10 INJECTION, EMULSION INTRAVENOUS at 12:23

## 2023-10-26 RX ADMIN — Medication 100 MCG: at 12:25

## 2023-10-26 RX ADMIN — PROPOFOL 20 MG: 10 INJECTION, EMULSION INTRAVENOUS at 12:17

## 2023-10-26 RX ADMIN — PROPOFOL 30 MG: 10 INJECTION, EMULSION INTRAVENOUS at 12:19

## 2023-10-26 NOTE — ANESTHESIA PREPROCEDURE EVALUATION
Procedure:  PRE-OP ONLY    Relevant Problems   CARDIO   (+) A-fib (HCC)      Other   (+) Alcoholic intoxication without complication (HCC)      Afib, CVA on eliquis  Hx colon polyps  ETOH use  HTN  HLD  Anxiety  BPH           Anesthesia Plan  ASA Score- 3     Anesthesia Type- IV sedation with anesthesia with ASA Monitors. Additional Monitors:     Airway Plan:            Plan Factors-    Chart reviewed. Patient summary reviewed. Patient is not a current smoker. Patient did not smoke on day of surgery. Induction- intravenous.     Postoperative Plan-     Informed Consent-

## 2023-10-26 NOTE — ANESTHESIA POSTPROCEDURE EVALUATION
Post-Op Assessment Note    CV Status:  Stable  Pain Score: 0    Pain management: adequate     Mental Status:  Sleepy and arousable   Hydration Status:  Stable   PONV Controlled:  Controlled   Airway Patency:  Patent      Post Op Vitals Reviewed: Yes      Staff: CRNA         No notable events documented.     /58 (10/26/23 1236)    Temp      Pulse 75 (10/26/23 1236)   Resp 16 (10/26/23 1236)    SpO2 95 % (10/26/23 1236)

## 2023-10-26 NOTE — ANESTHESIA PREPROCEDURE EVALUATION
Procedure:  COLONOSCOPY    Relevant Problems   CARDIO   (+) A-fib (HCC)      Afib, CVA on eliquis  Hx colon polyps  ETOH use  HTN  HLD  Anxiety  BPH      Physical Exam    Airway    Mallampati score: III  TM Distance: >3 FB  Neck ROM: full     Dental   No notable dental hx     Cardiovascular      Pulmonary      Other Findings        Anesthesia Plan  ASA Score- 3     Anesthesia Type- IV sedation with anesthesia with ASA Monitors. Additional Monitors:     Airway Plan:            Plan Factors-    Chart reviewed. Patient summary reviewed. Patient is not a current smoker. Patient did not smoke on day of surgery. Induction- intravenous. Postoperative Plan-     Informed Consent- Anesthetic plan and risks discussed with patient. I personally reviewed this patient with the CRNA. Discussed and agreed on the Anesthesia Plan with the CRNA. Ary Mata

## 2023-11-01 PROCEDURE — 88305 TISSUE EXAM BY PATHOLOGIST: CPT | Performed by: PATHOLOGY
